# Patient Record
Sex: FEMALE | Race: BLACK OR AFRICAN AMERICAN | NOT HISPANIC OR LATINO | URBAN - METROPOLITAN AREA
[De-identification: names, ages, dates, MRNs, and addresses within clinical notes are randomized per-mention and may not be internally consistent; named-entity substitution may affect disease eponyms.]

---

## 2019-06-14 ENCOUNTER — EMERGENCY (EMERGENCY)
Facility: HOSPITAL | Age: 64
LOS: 1 days | Discharge: HOME | End: 2019-06-14
Attending: EMERGENCY MEDICINE
Payer: COMMERCIAL

## 2019-06-14 VITALS
SYSTOLIC BLOOD PRESSURE: 134 MMHG | HEART RATE: 63 BPM | RESPIRATION RATE: 17 BRPM | OXYGEN SATURATION: 100 % | DIASTOLIC BLOOD PRESSURE: 84 MMHG | TEMPERATURE: 98 F

## 2019-06-14 VITALS
WEIGHT: 207.9 LBS | TEMPERATURE: 99 F | RESPIRATION RATE: 17 BRPM | SYSTOLIC BLOOD PRESSURE: 163 MMHG | OXYGEN SATURATION: 98 % | DIASTOLIC BLOOD PRESSURE: 90 MMHG | HEART RATE: 67 BPM | HEIGHT: 65 IN

## 2019-06-14 DIAGNOSIS — O00.9 ECTOPIC PREGNANCY, UNSPECIFIED: Chronic | ICD-10-CM

## 2019-06-14 PROCEDURE — 99284 EMERGENCY DEPT VISIT MOD MDM: CPT

## 2019-06-14 PROCEDURE — 99283 EMERGENCY DEPT VISIT LOW MDM: CPT

## 2019-06-14 RX ORDER — ACETAMINOPHEN 500 MG
650 TABLET ORAL ONCE
Refills: 0 | Status: COMPLETED | OUTPATIENT
Start: 2019-06-14 | End: 2019-06-14

## 2019-06-14 RX ORDER — IBUPROFEN 200 MG
600 TABLET ORAL ONCE
Refills: 0 | Status: COMPLETED | OUTPATIENT
Start: 2019-06-14 | End: 2019-06-14

## 2019-06-14 RX ADMIN — Medication 600 MILLIGRAM(S): at 10:40

## 2019-06-14 RX ADMIN — Medication 650 MILLIGRAM(S): at 10:39

## 2019-06-14 NOTE — ED PROVIDER NOTE - NS ED ROS FT
GENERAL: No fever or chills  EYES: no change in vision  HEENT: no trouble swallowing or speaking  CARDIAC: no chest pain or palpitations   PULMONARY: no cough or SOB  GI: no abdominal pain, nausea, vomiting, diarrhea, or constipation   : No changes in urination  SKIN: no rashes  NEURO: +HA as in HPI. no numbness, or weakness  MSK: No joint pain     ~Donald Davis PGY1

## 2019-06-14 NOTE — ED PROVIDER NOTE - PHYSICAL EXAMINATION
Gen: AAOx3, non-toxic  Head: NCAT  HEENT: EOMI, oral mucosa moist, normal conjunctiva  Lung: CTAB, no respiratory distress, no wheezes/rhonchi/rales B/L, speaking in full sentences  CV: RRR, no murmurs, rubs or gallops  Abd: soft, NTND, no guarding, no CVA tenderness  MSK: no visible deformities  Neuro: No focal sensory or motor deficits, normal CN exam   Skin: Warm, well perfused, no rash  Psych: normal affect.     ~Donald Davis PGY1

## 2019-06-14 NOTE — ED PROVIDER NOTE - NSFOLLOWUPINSTRUCTIONS_ED_ALL_ED_FT
Follow up with your PCP in 24-48 hours.   May take Tylenol and Motrin as directed on the bottle for headache.  Return to the ER if you develop any new or worsening symptoms such as chest pain, SOB, numbness, weakness, abdominal pain, nausea, vomiting, or visual changes.

## 2019-06-14 NOTE — ED PROVIDER NOTE - CLINICAL SUMMARY MEDICAL DECISION MAKING FREE TEXT BOX
HTN to 150/100 and mild gradual onset global HA. No concern for SAH or HTN-related end organ damage. Pt here soley out of concern for her BP. Will treat HA and dc to f/u with pcp. HTN to 150/100 and mild gradual onset global HA. No concern for SAH or HTN-related end organ damage. Pt here soley out of concern for her BP. Will treat HA and dc to f/u with pcp.    HARINI Kendrick MD: Pt is a 63 y/o female with PMH HTN p/w concern that her BP is too high. BP has been as high as 150/100 earlier this week, which lead her PCP to increase her metoprolol from 50 to 75 several day ago. The pt has been checking her BP since then and it has remained in the 140s, which prompted her to come to the ER. Only symptom is a mild dull gradual onset global HA since Tuesday that is c/w past HAs that the pt gets from time to time. Her physician prompted her to take add'l 25 mg (after 75 mg) if her BP is still >140 and HR >60. Denies any chest pain, visual changes, SOB, numbness, weakness, N/V/D, abdominal pain, back pain. Exam benign. No concern for acute end organ injury. Recommend outpt PMD f/u for BP medication adjustment

## 2019-06-14 NOTE — ED ADULT TRIAGE NOTE - CHIEF COMPLAINT QUOTE
pt c/o "HTN- saw MD on Tuesday and told to take extra dose of Metoprolol but its still high, slight head pressure, no CP"

## 2019-06-14 NOTE — ED ADULT NURSE NOTE - NSIMPLEMENTINTERV_GEN_ALL_ED
Implemented All Universal Safety Interventions:  [place] to call system. Call bell, personal items and telephone within reach. Instruct patient to call for assistance. Room bathroom lighting operational. Non-slip footwear when patient is off stretcher. Physically safe environment: no spills, clutter or unnecessary equipment. Stretcher in lowest position, wheels locked, appropriate side rails in place.

## 2019-06-14 NOTE — ED PROVIDER NOTE - OBJECTIVE STATEMENT
64F hx of HTN p/w with concern that her BP is too high. BP has been as high as 150/100 earlier this week, which lead her PCP to increase her metoprolol from 50 to 75 several day ago. The pt has been checking her BP since then and it has remained in the 140s, which prompted her to come to the ER. Only symptom is a mild dull gradual onset global HA since Tuesday that is c/w past HAs that the pt gets from time to time. Denies any chest pain, visual changes, SOB, numbness, weakness, N/V/D, abdominal pain.

## 2019-06-17 ENCOUNTER — RESULT REVIEW (OUTPATIENT)
Age: 64
End: 2019-06-17

## 2019-08-30 ENCOUNTER — APPOINTMENT (OUTPATIENT)
Dept: ORTHOPEDIC SURGERY | Facility: CLINIC | Age: 64
End: 2019-08-30
Payer: MEDICAID

## 2019-08-30 VITALS
SYSTOLIC BLOOD PRESSURE: 140 MMHG | WEIGHT: 207 LBS | BODY MASS INDEX: 34.49 KG/M2 | DIASTOLIC BLOOD PRESSURE: 81 MMHG | HEIGHT: 65 IN | HEART RATE: 53 BPM

## 2019-08-30 DIAGNOSIS — Z72.89 OTHER PROBLEMS RELATED TO LIFESTYLE: ICD-10-CM

## 2019-08-30 DIAGNOSIS — M47.816 SPONDYLOSIS W/OUT MYELOPATHY OR RADICULOPATHY, LUMBAR REGION: ICD-10-CM

## 2019-08-30 DIAGNOSIS — M67.98 UNSPECIFIED DISORDER OF SYNOVIUM AND TENDON, OTHER SITE: ICD-10-CM

## 2019-08-30 DIAGNOSIS — Z78.9 OTHER SPECIFIED HEALTH STATUS: ICD-10-CM

## 2019-08-30 PROCEDURE — 72100 X-RAY EXAM L-S SPINE 2/3 VWS: CPT

## 2019-08-30 PROCEDURE — 99204 OFFICE O/P NEW MOD 45 MIN: CPT

## 2019-08-30 PROCEDURE — 73502 X-RAY EXAM HIP UNI 2-3 VIEWS: CPT

## 2019-08-30 NOTE — REVIEW OF SYSTEMS
[Arthralgia] : arthralgia [Joint Swelling] : joint swelling [Negative] : Heme/Lymph [Joint Pain] : no joint pain [Joint Stiffness] : no joint stiffness

## 2019-08-30 NOTE — CONSULT LETTER
[Dear  ___] : Dear  [unfilled], [Consult Letter:] : I had the pleasure of evaluating your patient, [unfilled]. [Please see my note below.] : Please see my note below. [Consult Closing:] : Thank you very much for allowing me to participate in the care of this patient.  If you have any questions, please do not hesitate to contact me. [Sincerely,] : Sincerely, [FreeTextEntry3] : Salomon Leavitt MD\par \par ______________________________________________\par [facility]: Hip/Knee Arthroplasty\par [address], [facility] [address], [place] [address]\par (t) [phone]\par (f) [phone]  [FreeTextEntry2] : [employer]

## 2019-08-30 NOTE — PHYSICAL EXAM
[Normal] : Gait: normal [Knee] : patellar 2+ and symmetric bilaterally [LE] : Sensory: Intact in bilateral lower extremities [Ankle] : ankle 2+ and symmetric bilaterally [DP] : dorsalis pedis 2+ and symmetric bilaterally [PT] : posterior tibial 2+ and symmetric bilaterally [de-identified] : On general examination the patient is adequately groomed and nourished. The vital parameters are as recorded. \par There is no lymphedema or diffuse swelling, no varicose veins, no skin warmth/erythema/scars/swelling, no ulcers and no palpable lymph nodes or masses in both lower extremities. Bilateral pedal pulses are well palpable.\par Upper Extremity:\par Both right and left upper extremities are unremarkable in terms of skin rash, lesions, pigmentation, redness, tenderness, swelling, joint instability, abnormal deformity or crepitus. The overall range of motion, sensation, motor tone and strength testing are normal.\par \par Hip Exam:\par The gait and station is normal\par The patient has equal leg lengths and no pelvic tilt. Prudencio/Mario test is 7 inches on the right and 7 inches on the left. Active SLR is 60 degrees on the right and 60 degrees on the left. Both hips demonstrate no scars and the skin has no signs of inflammation or tenderness. \par Both Hips have a normal range of motion of flexion to 100 degrees, abduction 40 degrees, adduction 20 degrees, external rotation 40 degrees, internal rotation 20 degrees with symmetrical motion in flexion and extension. There is no flexion contracture, deformity or instability. Labral impingement tests are negative.\par Both hips flexor, abductor and extensor power is normal.\par There is tenderness to palpation of the gluteus medius tendon. \par \par Spine Exam:\par There is mild curvature of the spine with loss of normal lumbar lordosis. The skin is devoid of erythema, scars, pigmentation or rashes. There is mild lumbar spasm and tenderness especially at L4-L5 or L5-S1. \par The range of motion of the lumbar spine is limited by pain and spasm. Forward flexion is 80% normal, extension catch positive, lateral flexion and rotation 80% normal. There is no lumbar spine imbalance or instability detected.\par Bilateral passive SLR is right 40 degrees, left 40 degrees in supine and sitting positions. Lasegue's Test is negative.\par Neurology:\par The patient is alert and oriented in person, place and time. The mood is calm and affect is normal.\par Testing for coordination including Rhomberg's Test and Finger-Nose Test, sensation, motor tone and power and deep tendon reflexes in both lower extremities is normal.\par  [de-identified] : The following radiographs were ordered and read by me during this patients visit. I reviewed each radiograph in detail with the patient and discussed the findings as highlighted below. \par AP and false profile views of the left hip and AP view of the pelvis are within normal limits. \par AP and lateral views of the lumbar spine reveals mild DJD.\par

## 2019-08-30 NOTE — DISCUSSION/SUMMARY
[de-identified] : Mild Lumbar DJD, Gluteus Medius Bursitis \par The patient was informed of the findings and recommended conservative management in the form of a home exercise program, activity modifications, stationary bicycling, swimming and weight loss program. A trial of Glucosamine and Chondroiten Sulphate was recommended.\par A prescription for a course of physical therapy was provided.\par Patient will take OTC NSAIDs as needed for pain. \par A depo-medrol injection was offered to patient but she wishes to hold off for now. \par Follow-up appointment was recommended in 3 months.\par

## 2019-08-30 NOTE — HISTORY OF PRESENT ILLNESS
[6] : an average pain level of 6/10 [de-identified] : Ms. ALLIE FOOTE is a 64 year old female presenting with years of right hip/lower back pain. Pain is localizes to the lateral/posterior aspect of the right hip. No groin pain. She feels the lower back pain radiates down the lateral and anterior aspect of the right thigh. Symptoms are worsened with weightbearing activity including walking long distance and stairs. She has not had PT or injections. She takes OTC NSAIDs as needed.

## 2022-06-01 ENCOUNTER — NON-APPOINTMENT (OUTPATIENT)
Age: 67
End: 2022-06-01

## 2022-07-05 ENCOUNTER — APPOINTMENT (OUTPATIENT)
Dept: ORTHOPEDIC SURGERY | Facility: CLINIC | Age: 67
End: 2022-07-05

## 2022-07-12 ENCOUNTER — APPOINTMENT (OUTPATIENT)
Dept: ORTHOPEDIC SURGERY | Facility: CLINIC | Age: 67
End: 2022-07-12

## 2022-07-20 ENCOUNTER — APPOINTMENT (OUTPATIENT)
Dept: ORTHOPEDIC SURGERY | Facility: CLINIC | Age: 67
End: 2022-07-20

## 2022-07-20 VITALS
WEIGHT: 207 LBS | HEIGHT: 65 IN | SYSTOLIC BLOOD PRESSURE: 125 MMHG | BODY MASS INDEX: 34.49 KG/M2 | DIASTOLIC BLOOD PRESSURE: 76 MMHG

## 2022-07-20 DIAGNOSIS — M17.10 UNILATERAL PRIMARY OSTEOARTHRITIS, UNSPECIFIED KNEE: ICD-10-CM

## 2022-07-20 PROCEDURE — 20611 DRAIN/INJ JOINT/BURSA W/US: CPT | Mod: RT

## 2022-07-20 PROCEDURE — 99204 OFFICE O/P NEW MOD 45 MIN: CPT | Mod: 25

## 2022-07-23 ENCOUNTER — NON-APPOINTMENT (OUTPATIENT)
Age: 67
End: 2022-07-23

## 2022-07-28 ENCOUNTER — NON-APPOINTMENT (OUTPATIENT)
Age: 67
End: 2022-07-28

## 2022-07-28 ENCOUNTER — APPOINTMENT (OUTPATIENT)
Dept: CARDIOLOGY | Facility: CLINIC | Age: 67
End: 2022-07-28

## 2022-07-28 ENCOUNTER — APPOINTMENT (OUTPATIENT)
Dept: OPHTHALMOLOGY | Facility: CLINIC | Age: 67
End: 2022-07-28

## 2022-07-28 VITALS
HEIGHT: 65 IN | OXYGEN SATURATION: 95 % | WEIGHT: 204 LBS | BODY MASS INDEX: 33.99 KG/M2 | HEART RATE: 65 BPM | DIASTOLIC BLOOD PRESSURE: 94 MMHG | SYSTOLIC BLOOD PRESSURE: 154 MMHG

## 2022-07-28 VITALS — SYSTOLIC BLOOD PRESSURE: 136 MMHG | DIASTOLIC BLOOD PRESSURE: 86 MMHG

## 2022-07-28 DIAGNOSIS — Z82.49 FAMILY HISTORY OF ISCHEMIC HEART DISEASE AND OTHER DISEASES OF THE CIRCULATORY SYSTEM: ICD-10-CM

## 2022-07-28 DIAGNOSIS — Z78.9 OTHER SPECIFIED HEALTH STATUS: ICD-10-CM

## 2022-07-28 DIAGNOSIS — I10 ESSENTIAL (PRIMARY) HYPERTENSION: ICD-10-CM

## 2022-07-28 PROCEDURE — 99203 OFFICE O/P NEW LOW 30 MIN: CPT | Mod: 25

## 2022-07-28 PROCEDURE — 92015 DETERMINE REFRACTIVE STATE: CPT | Mod: NC

## 2022-07-28 PROCEDURE — 92004 COMPRE OPH EXAM NEW PT 1/>: CPT

## 2022-07-28 PROCEDURE — 93000 ELECTROCARDIOGRAM COMPLETE: CPT

## 2022-07-28 RX ORDER — METOPROLOL SUCCINATE 50 MG/1
50 TABLET, EXTENDED RELEASE ORAL DAILY
Refills: 0 | Status: ACTIVE | COMMUNITY
Start: 2022-02-07

## 2022-07-28 RX ORDER — METOPROLOL TARTRATE 75 MG/1
TABLET, FILM COATED ORAL
Refills: 0 | Status: DISCONTINUED | COMMUNITY
End: 2022-07-28

## 2022-07-28 NOTE — REVIEW OF SYSTEMS
[Dyspnea on exertion] : dyspnea during exertion [Joint Pain] : joint pain [Negative] : Respiratory [Blurry Vision] : no blurred vision [Chest Discomfort] : no chest discomfort [Lower Ext Edema] : no extremity edema [Palpitations] : no palpitations [Orthopnea] : no orthopnea [PND] : no PND [Abdominal Pain] : no abdominal pain [Nausea] : no nausea [Rash] : no rash [Itching] : no itching [Dizziness] : no dizziness [Tremor] : no tremor was seen [Numbness (Hypoesthesia)] : no numbness [Tingling (Paresthesia)] : no tingling [Confusion] : no confusion was observed [Anxiety] : no anxiety [Under Stress] : not under stress [Easy Bleeding] : no tendency for easy bleeding [Easy Bruising] : no tendency for easy bruising

## 2022-07-28 NOTE — HISTORY OF PRESENT ILLNESS
[FreeTextEntry1] : Uriah Bass is a 67 year old female with Hypertension comes for cardiac evaluation. Denies any chest pain or palpitations. Mild shortness of breath on exertion which is relieved with rest in few minutes of few months duration. Complaint to medications and diet. Physically not much active. Follows up with PCP.

## 2022-07-28 NOTE — DISCUSSION/SUMMARY
[FreeTextEntry1] : In a summary Uriah Bass is a middle aged female with Hypertension, controlled. Continue Metoprolol ER and 2 gm sodium diet. Shortness of breath on exertion and Hypertension, Will get Echo to assess LV systolic function. Further plan based on Echo results. Lose weight. Healthy lifestyle.

## 2022-08-08 ENCOUNTER — APPOINTMENT (OUTPATIENT)
Dept: CARDIOLOGY | Facility: CLINIC | Age: 67
End: 2022-08-08

## 2022-08-08 DIAGNOSIS — R06.02 SHORTNESS OF BREATH: ICD-10-CM

## 2022-08-08 PROCEDURE — 93306 TTE W/DOPPLER COMPLETE: CPT

## 2022-08-17 PROBLEM — R06.02 SHORTNESS OF BREATH ON EXERTION: Status: ACTIVE | Noted: 2022-07-28

## 2022-08-22 ENCOUNTER — APPOINTMENT (OUTPATIENT)
Dept: ORTHOPEDIC SURGERY | Facility: CLINIC | Age: 67
End: 2022-08-22

## 2022-10-19 ENCOUNTER — APPOINTMENT (OUTPATIENT)
Dept: OBGYN | Facility: CLINIC | Age: 67
End: 2022-10-19

## 2024-12-25 PROBLEM — F10.90 ALCOHOL USE: Status: ACTIVE | Noted: 2019-08-30

## 2025-02-08 ENCOUNTER — APPOINTMENT (OUTPATIENT)
Dept: OPHTHALMOLOGY | Facility: CLINIC | Age: 70
End: 2025-02-08

## 2025-03-22 ENCOUNTER — APPOINTMENT (OUTPATIENT)
Dept: OPHTHALMOLOGY | Facility: CLINIC | Age: 70
End: 2025-03-22

## 2025-08-25 ENCOUNTER — APPOINTMENT (OUTPATIENT)
Dept: OBGYN | Facility: CLINIC | Age: 70
End: 2025-08-25

## 2025-09-15 ENCOUNTER — APPOINTMENT (OUTPATIENT)
Dept: OBGYN | Facility: CLINIC | Age: 70
End: 2025-09-15

## 2025-09-16 ENCOUNTER — APPOINTMENT (OUTPATIENT)
Dept: OPHTHALMOLOGY | Facility: CLINIC | Age: 70
End: 2025-09-16